# Patient Record
Sex: FEMALE | Race: WHITE
[De-identification: names, ages, dates, MRNs, and addresses within clinical notes are randomized per-mention and may not be internally consistent; named-entity substitution may affect disease eponyms.]

---

## 2018-05-18 ENCOUNTER — HOSPITAL ENCOUNTER (EMERGENCY)
Dept: HOSPITAL 45 - C.EDA | Age: 18
Discharge: ANOTHER HOSPITAL | End: 2018-05-18
Payer: COMMERCIAL

## 2018-05-18 VITALS — DIASTOLIC BLOOD PRESSURE: 67 MMHG | SYSTOLIC BLOOD PRESSURE: 118 MMHG | OXYGEN SATURATION: 100 % | HEART RATE: 67 BPM

## 2018-05-18 VITALS
HEIGHT: 65.98 IN | HEIGHT: 65.98 IN | WEIGHT: 140.21 LBS | BODY MASS INDEX: 22.53 KG/M2 | BODY MASS INDEX: 22.53 KG/M2 | WEIGHT: 140.21 LBS

## 2018-05-18 VITALS — TEMPERATURE: 98.24 F | OXYGEN SATURATION: 100 %

## 2018-05-18 DIAGNOSIS — S06.0X1A: Primary | ICD-10-CM

## 2018-05-18 DIAGNOSIS — S30.810A: ICD-10-CM

## 2018-05-18 DIAGNOSIS — S80.211A: ICD-10-CM

## 2018-05-18 DIAGNOSIS — Z91.018: ICD-10-CM

## 2018-05-18 DIAGNOSIS — Z79.899: ICD-10-CM
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
## 2018-05-18 DIAGNOSIS — S80.212A: ICD-10-CM

## 2018-05-18 DIAGNOSIS — J93.9: ICD-10-CM

## 2018-05-18 LAB
ALBUMIN SERPL-MCNC: 4.4 GM/DL (ref 3.4–5)
ALP SERPL-CCNC: 47 U/L (ref 45–117)
ALT SERPL-CCNC: 43 U/L (ref 12–78)
AST SERPL-CCNC: 51 U/L (ref 15–37)
BASOPHILS # BLD: 0.01 K/UL (ref 0–0.2)
BASOPHILS NFR BLD: 0.2 %
BUN SERPL-MCNC: 12 MG/DL (ref 7–18)
CA-I BLD-SCNC: 1.05 MMOL/L
CALCIUM SERPL-MCNC: 8.8 MG/DL (ref 8.5–10.1)
CO2 SERPL-SCNC: 27 MMOL/L (ref 21–32)
CREAT BLD-MCNC: 1 MG/DL
CREAT SERPL-MCNC: 1 MG/DL (ref 0.6–1.2)
EOS ABS #: 0.31 K/UL (ref 0–0.5)
EOSINOPHIL NFR BLD AUTO: 272 K/UL (ref 130–400)
GLUCOSE SERPL-MCNC: 90 MG/DL (ref 70–99)
HCT VFR BLD CALC: 38.5 % (ref 37–47)
HGB BLD-MCNC: 13.3 G/DL (ref 12–16)
IG#: 0.01 K/UL (ref 0–0.02)
IMM GRANULOCYTES NFR BLD AUTO: 25.4 %
ISTAT POTASSIUM: 3.7 MEQ/L (ref 3.3–5)
LIPASE: 152 U/L (ref 73–393)
LYMPHOCYTES # BLD: 1.23 K/UL (ref 1.2–3.4)
MCH RBC QN AUTO: 31.7 PG (ref 25–34)
MCHC RBC AUTO-ENTMCNC: 34.5 G/DL (ref 32–36)
MCV RBC AUTO: 91.7 FL (ref 80–100)
MONO ABS #: 0.36 K/UL (ref 0.11–0.59)
MONOCYTES NFR BLD: 7.4 %
NEUT ABS #: 2.93 K/UL (ref 1.4–6.5)
NEUTROPHILS # BLD AUTO: 6.4 %
NEUTROPHILS NFR BLD AUTO: 60.4 %
PMV BLD AUTO: 9.3 FL (ref 7.4–10.4)
POTASSIUM SERPL-SCNC: 3.8 MMOL/L (ref 3.5–5.1)
PROT SERPL-MCNC: 7.4 GM/DL (ref 6.4–8.2)
RED CELL DISTRIBUTION WIDTH CV: 12.6 % (ref 11.5–14.5)
RED CELL DISTRIBUTION WIDTH SD: 42.4 FL (ref 36.4–46.3)
SODIUM BLD-SCNC: 143 MEQ/L (ref 135–144)
SODIUM SERPL-SCNC: 141 MMOL/L (ref 136–145)
WBC # BLD AUTO: 4.85 K/UL (ref 4.8–10.8)
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
## 2018-08-15 ENCOUNTER — HOSPITAL ENCOUNTER (OUTPATIENT)
Dept: HOSPITAL 45 - C.LABSPEC | Age: 18
Discharge: HOME | End: 2018-08-15
Attending: OBSTETRICS & GYNECOLOGY
Payer: COMMERCIAL

## 2018-08-15 DIAGNOSIS — Z11.3: Primary | ICD-10-CM
